# Patient Record
Sex: MALE | Race: WHITE | NOT HISPANIC OR LATINO | Employment: UNEMPLOYED | URBAN - NONMETROPOLITAN AREA
[De-identification: names, ages, dates, MRNs, and addresses within clinical notes are randomized per-mention and may not be internally consistent; named-entity substitution may affect disease eponyms.]

---

## 2019-09-16 ENCOUNTER — OFFICE VISIT (OUTPATIENT)
Dept: ORTHOPEDIC SURGERY | Facility: CLINIC | Age: 54
End: 2019-09-16

## 2019-09-16 ENCOUNTER — HOSPITAL ENCOUNTER (OUTPATIENT)
Dept: GENERAL RADIOLOGY | Facility: HOSPITAL | Age: 54
End: 2019-09-16

## 2019-09-16 VITALS
WEIGHT: 250 LBS | HEIGHT: 72 IN | DIASTOLIC BLOOD PRESSURE: 102 MMHG | SYSTOLIC BLOOD PRESSURE: 154 MMHG | BODY MASS INDEX: 33.86 KG/M2 | HEART RATE: 97 BPM

## 2019-09-16 DIAGNOSIS — G89.29 CHRONIC PAIN OF RIGHT KNEE: ICD-10-CM

## 2019-09-16 DIAGNOSIS — M25.461 PAIN AND SWELLING OF KNEE, RIGHT: ICD-10-CM

## 2019-09-16 DIAGNOSIS — M25.512 LEFT SHOULDER PAIN, UNSPECIFIED CHRONICITY: Primary | ICD-10-CM

## 2019-09-16 DIAGNOSIS — M25.561 PAIN AND SWELLING OF KNEE, RIGHT: ICD-10-CM

## 2019-09-16 DIAGNOSIS — M25.561 CHRONIC PAIN OF RIGHT KNEE: ICD-10-CM

## 2019-09-16 PROCEDURE — 99203 OFFICE O/P NEW LOW 30 MIN: CPT | Performed by: ORTHOPAEDIC SURGERY

## 2019-09-16 RX ORDER — CYCLOBENZAPRINE HCL 10 MG
10 TABLET ORAL 3 TIMES DAILY PRN
COMMUNITY

## 2019-09-16 RX ORDER — IBUPROFEN 800 MG/1
800 TABLET ORAL EVERY 6 HOURS PRN
COMMUNITY

## 2019-09-16 NOTE — PROGRESS NOTES
New Patient Visit      Patient: KAMRAN Bridges  YOB: 1965  Date of Encounter: 09/16/2019        Chief Complaint:   Chief Complaint   Patient presents with   • Left Shoulder - Pain, Initial Evaluation   • Right Knee - Pain, Initial Evaluation         HPI:   KAMRAN Bridges, 53 y.o. male, referred by Mitul Drummond MD for evaluation of left shoulder pain and right knee pain.  Regarding his right knee he reports pain for many years.  He sustained injury to his right knee in the 90s.  He reports intermittent limp since then with pain upon weightbearing.  He is received steroid injection in the past with no improvement he also became very nauseous and is reluctant to receive additional steroid injections.  He currently wears a brace to his right knee.  He states that his right knee at times feels unstable as if it is going to hyperextend again which is painful for him.  Denies actual giving way or locking he does acknowledge intermittent swelling.  Thus far other than single steroid injection he has not had  treatment for his right knee.  Regarding his left shoulder he underwent surgery January 19, 2019 per Dr. Yanes.  He continues to experience pain rest and worse by activity.  He localizes pain diffusely about his shoulder from the posterior aspect along the lateral scapular border.  He denies numbness in his left arm but does complain of significant weakness stating that he was very strong in the past and now he is unable to do much lifting or other strenuous activity.    Active Problem List:  There is no problem list on file for this patient.        Past Medical History:  History reviewed. No pertinent past medical history.      Past Surgical History:  Past Surgical History:   Procedure Laterality Date   • ROTATOR CUFF REPAIR Left          Family History:  History reviewed. No pertinent family history.      Social History:  Social History     Socioeconomic History   • Marital status: Single      "Spouse name: Not on file   • Number of children: Not on file   • Years of education: Not on file   • Highest education level: Not on file   Tobacco Use   • Smoking status: Former Smoker   • Smokeless tobacco: Never Used   Substance and Sexual Activity   • Alcohol use: Yes   • Drug use: No     Body mass index is 33.91 kg/m².      Medications:  Current Outpatient Medications   Medication Sig Dispense Refill   • cyclobenzaprine (FLEXERIL) 10 MG tablet Take 10 mg by mouth 3 (Three) Times a Day As Needed for Muscle Spasms.     • ibuprofen (ADVIL,MOTRIN) 800 MG tablet Take 800 mg by mouth Every 6 (Six) Hours As Needed for Mild Pain .       No current facility-administered medications for this visit.          Allergies:  Allergies   Allergen Reactions   • Naproxen Shortness Of Breath         Review of Systems:   Review of Systems   Constitutional: Positive for fatigue.   HENT: Negative.    Eyes: Negative.    Respiratory: Negative.    Cardiovascular: Negative.    Gastrointestinal: Negative.    Endocrine: Negative.    Genitourinary: Negative.    Musculoskeletal: Positive for arthralgias and back pain.   Skin: Negative.    Allergic/Immunologic: Negative.    Hematological: Negative.    Psychiatric/Behavioral: Negative.          Physical Exam:   Physical Exam  GENERAL: 53 y.o. male, alert and oriented X 3 in no acute distress.   Visit Vitals  BP (!) 154/102   Pulse 97   Ht 182.9 cm (72\")   Wt 113 kg (250 lb)   BMI 33.91 kg/m²     Musculoskeletal right knee evaluation reveals mild effusion with moderate medial joint line tenderness mild lateral joint line tenderness no gross instability with varus valgus stressing Lachman or drawer.  He demonstrates full extension flexes to 130 degrees with negative Sarah.  Patella is without discomfort or crepitance no tenderness along the patellar ligament.  Neurovascular examination is grossly intact.  Shoulder evaluation reveals multiple scars from previous surgery.  He has mild atrophy " supraspinatus region.  He can abduct to 90 degrees and has good strength with Jobes maneuver with mild pain.  Prehension sign is negative but he is reluctant to move in all directions at full extremes.  He demonstrates moderate tenderness posteriorly along the lateral border of the scapula.  Neurovascular exam to the left arm is intact.      Radiology/Labs:     MRI left shoulder by report describes partial thickness articular surface tear of less than 50% involving the supraspinatus tendon.  Postoperative changes AC joint postoperative changes biceps tenodesis SLAP tear extending to the posterior labrum.  This MRI was completed postoperatively August 2017.  Right knee radiographs are negative.    Assessment & Plan:   53 y.o. male with knee complaints of many years concerning for meniscal tear given his failed response to intra-articular steroid injection.  We have requested MRI of his right knee and we will see him back when completed.  Regarding his left shoulder complaints I think he is better served seeing [facility] medicine or shoulder specialist he is referred to the [facility].  He will follow-up right knee once MRI is completed.      ICD-10-CM ICD-9-CM   1. Left shoulder pain, unspecified chronicity M25.512 719.41   2. Chronic pain of right knee M25.561 719.46    G89.29 338.29   3. Pain and swelling of knee, right M25.561 719.46    M25.461 719.06           Cc:   Mitul Drummond MD                This document has been electronically signed by David Pacheco MD   September 18, 2019 8:25 AM

## 2019-10-01 ENCOUNTER — HOSPITAL ENCOUNTER (OUTPATIENT)
Dept: MRI IMAGING | Facility: HOSPITAL | Age: 54
Discharge: HOME | End: 2019-10-01
Admitting: ORTHOPAEDIC SURGERY

## 2019-10-01 DIAGNOSIS — M25.561 PAIN AND SWELLING OF KNEE, RIGHT: ICD-10-CM

## 2019-10-01 DIAGNOSIS — M25.561 CHRONIC PAIN OF RIGHT KNEE: ICD-10-CM

## 2019-10-01 DIAGNOSIS — M25.461 PAIN AND SWELLING OF KNEE, RIGHT: ICD-10-CM

## 2019-10-01 DIAGNOSIS — G89.29 CHRONIC PAIN OF RIGHT KNEE: ICD-10-CM

## 2019-10-01 PROCEDURE — 73721 MRI JNT OF LWR EXTRE W/O DYE: CPT | Performed by: RADIOLOGY

## 2019-10-01 PROCEDURE — 73721 MRI JNT OF LWR EXTRE W/O DYE: CPT

## 2019-10-07 ENCOUNTER — OFFICE VISIT (OUTPATIENT)
Dept: ORTHOPEDIC SURGERY | Facility: CLINIC | Age: 54
End: 2019-10-07

## 2019-10-07 VITALS — WEIGHT: 250 LBS | BODY MASS INDEX: 33.86 KG/M2 | HEIGHT: 72 IN

## 2019-10-07 DIAGNOSIS — G89.29 CHRONIC PAIN OF RIGHT KNEE: Primary | ICD-10-CM

## 2019-10-07 DIAGNOSIS — S83.221D PERIPHERAL TEAR OF MEDIAL MENISCUS OF RIGHT KNEE, UNSPECIFIED WHETHER OLD OR CURRENT TEAR, SUBSEQUENT ENCOUNTER: ICD-10-CM

## 2019-10-07 DIAGNOSIS — M25.561 CHRONIC PAIN OF RIGHT KNEE: Primary | ICD-10-CM

## 2019-10-07 PROCEDURE — 99213 OFFICE O/P EST LOW 20 MIN: CPT | Performed by: ORTHOPAEDIC SURGERY

## 2019-10-07 PROCEDURE — 20610 DRAIN/INJ JOINT/BURSA W/O US: CPT | Performed by: ORTHOPAEDIC SURGERY

## 2019-10-07 RX ADMIN — METHYLPREDNISOLONE ACETATE 80 MG: 40 INJECTION, SUSPENSION INTRA-ARTICULAR; INTRALESIONAL; INTRAMUSCULAR; SOFT TISSUE at 12:27

## 2019-10-07 RX ADMIN — LIDOCAINE HYDROCHLORIDE 2 ML: 20 INJECTION, SOLUTION INFILTRATION; PERINEURAL at 12:27

## 2019-10-07 NOTE — PROGRESS NOTES
Follow-up Visit         Patient: KAMRAN Bridges  YOB: 1965  Date of Encounter: 10/07/2019      Chief  Complaint:   Chief Complaint   Patient presents with   • Right Knee - Pain         HPI:  KAMRAN Bridges, 53 y.o. male returns today in follow-up with continued right knee pain.  His only injury was in the 90s.  He complains of pain when walking and reports that he limps.  He has had single steroid injection right knee which gave him a few hours of relief.  He continues to wear a brace and states that he feels more stable with his brace in place.  He has now had MRI completed.    Medical History:  Patient Active Problem List   Diagnosis   • Chronic pain of right knee     Past Medical History:   Diagnosis Date   • Right knee pain        Social History:  Social History     Socioeconomic History   • Marital status: Single     Spouse name: Not on file   • Number of children: Not on file   • Years of education: Not on file   • Highest education level: Not on file   Tobacco Use   • Smoking status: Former Smoker   • Smokeless tobacco: Never Used   Substance and Sexual Activity   • Alcohol use: Yes   • Drug use: No       Surgical History:  Past Surgical History:   Procedure Laterality Date   • ROTATOR CUFF REPAIR Left        Radiology:   MRI right knee reveals horizontal signal through the posterior and midportion of the medial meniscus with abnormal tunnel seen on both coronal and sagittal sections.  On lateral image 20 of 25 there is horizontal signal within the posterior horn the medial meniscus extending to the inferior articular surface.      Examination:   Right knee evaluation reveals marked medial joint line tenderness.  He has minimal effusion.  He demonstrates no gross instability with varus valgus stressing.  Lockman and drawer are negative.  He demonstrates full extension he flexes to 120 degrees with moderately positive Sarah sign.  Neurovascular examination is grossly intact.      Assessment &  Plan:   53 y.o. male clinical presentation consistent with medial meniscus tear supported by MRI findings.  Think he is a candidate for arthroscopic partial medial meniscectomy.  Today he is provided Depo-Medrol 40 mg intra-articular with lidocaine block.  We will evaluate his progress in 3 weeks.  If he does not receive significant relief we will likely proceed with arthroscopic partial medial meniscectomy.         Diagnosis Plan   1. Chronic pain of right knee  Large Joint Arthrocentesis: R knee   2. Peripheral tear of medial meniscus of right knee, unspecified whether old or current tear, subsequent encounter         Large Joint Arthrocentesis: R knee  Date/Time: 10/7/2019 12:27 PM  Consent given by: patient  Timeout: Immediately prior to procedure a time out was called to verify the correct patient, procedure, equipment, support staff and site/side marked as required   Supporting Documentation  Indications: pain   Procedure Details  Location: knee - R knee  Preparation: Patient was prepped and draped in the usual sterile fashion  Needle size: 25 G  Approach: anterolateral  Medications administered: 2 mL lidocaine 2%; 80 mg methylPREDNISolone acetate 40 MG/ML  Patient tolerance: patient tolerated the procedure well with no immediate complications              Cc:  Mitul Drummond MD              This document has been electronically signed by David Pacheco MD   October 7, 2019 6:17 PM

## 2019-10-14 RX ORDER — METHYLPREDNISOLONE ACETATE 40 MG/ML
80 INJECTION, SUSPENSION INTRA-ARTICULAR; INTRALESIONAL; INTRAMUSCULAR; SOFT TISSUE
Status: COMPLETED | OUTPATIENT
Start: 2019-10-07 | End: 2019-10-07

## 2019-10-14 RX ORDER — LIDOCAINE HYDROCHLORIDE 20 MG/ML
2 INJECTION, SOLUTION INFILTRATION; PERINEURAL
Status: COMPLETED | OUTPATIENT
Start: 2019-10-07 | End: 2019-10-07

## 2019-10-28 ENCOUNTER — OFFICE VISIT (OUTPATIENT)
Dept: ORTHOPEDIC SURGERY | Facility: CLINIC | Age: 54
End: 2019-10-28

## 2019-10-28 VITALS
HEART RATE: 87 BPM | HEIGHT: 72 IN | WEIGHT: 249.12 LBS | SYSTOLIC BLOOD PRESSURE: 140 MMHG | DIASTOLIC BLOOD PRESSURE: 97 MMHG | BODY MASS INDEX: 33.74 KG/M2

## 2019-10-28 DIAGNOSIS — S83.221D PERIPHERAL TEAR OF MEDIAL MENISCUS OF RIGHT KNEE, UNSPECIFIED WHETHER OLD OR CURRENT TEAR, SUBSEQUENT ENCOUNTER: Primary | ICD-10-CM

## 2019-10-28 DIAGNOSIS — M25.512 LEFT SHOULDER PAIN, UNSPECIFIED CHRONICITY: ICD-10-CM

## 2019-10-28 PROCEDURE — 99214 OFFICE O/P EST MOD 30 MIN: CPT | Performed by: ORTHOPAEDIC SURGERY

## 2019-10-28 NOTE — PROGRESS NOTES
History and Physical      Patient: KAMRAN Bridges  YOB: 1965  Date of Encounter: 10/28/2019      Chief Complaint:   Chief Complaint   Patient presents with   • Right Knee - Pain, Edema, Follow-up         HPI:   KAMRAN Birdges, 53 y.o. male, seen today in follow-up with his right knee complaints.  He continues to struggle with right knee pain and reports slight relief following steroid injection for a few weeks but his symptoms have persisted and he is also received steroid injection prior to this injection provided October 7 again with very limited response.  He localizes pain to the medial aspect and reports that his symptoms are worsening.  He denies recent injury but does report injury to his he does not experience giving way.  Does report catching sensation at times and that he walks with a limp when his symptoms are severe.  He has worn brace in the past states this makes his knee feel more stable.  Right knee in the 1990s.      Active Problem List:  Patient Active Problem List   Diagnosis   • Chronic pain of right knee         Past Medical History:  Past Medical History:   Diagnosis Date   • Right knee pain          Past Surgical History:  Past Surgical History:   Procedure Laterality Date   • ROTATOR CUFF REPAIR Left          Family History:  History reviewed. No pertinent family history.      Social History:  Social History     Socioeconomic History   • Marital status: Single     Spouse name: Not on file   • Number of children: Not on file   • Years of education: Not on file   • Highest education level: Not on file   Tobacco Use   • Smoking status: Former Smoker   • Smokeless tobacco: Never Used   Substance and Sexual Activity   • Alcohol use: Yes   • Drug use: No     Body mass index is 33.78 kg/m².      Medications:  Current Outpatient Medications   Medication Sig Dispense Refill   • cyclobenzaprine (FLEXERIL) 10 MG tablet Take 10 mg by mouth 3 (Three) Times a Day As Needed for Muscle Spasms.    "  • ibuprofen (ADVIL,MOTRIN) 800 MG tablet Take 800 mg by mouth Every 6 (Six) Hours As Needed for Mild Pain .       No current facility-administered medications for this visit.          Allergies:  Allergies   Allergen Reactions   • Naproxen Shortness Of Breath         Review of Systems:   Review of Systems   Constitutional: Negative.    HENT: Negative.    Eyes: Negative.    Respiratory: Negative.    Cardiovascular: Negative.    Gastrointestinal: Negative.    Endocrine: Negative.    Genitourinary: Negative.    Musculoskeletal: Positive for arthralgias.   Skin: Negative.    Allergic/Immunologic: Negative.    Neurological: Negative.    Hematological: Negative.    Psychiatric/Behavioral: Negative.          Physical Exam:   Physical Exam   Constitutional: He is oriented to person, place, and time. He appears well-developed and well-nourished.   HENT:   Head: Normocephalic and atraumatic.   Mouth/Throat: Oropharynx is clear and moist.   Eyes: Conjunctivae and EOM are normal. Pupils are equal, round, and reactive to light.   Neck: Normal range of motion. Neck supple. No JVD present. No thyromegaly present.   Cardiovascular: Normal rate, regular rhythm and normal heart sounds. Exam reveals no gallop and no friction rub.   No murmur heard.  Pulmonary/Chest: Effort normal and breath sounds normal. No respiratory distress. He has no wheezes. He has no rales. He exhibits no tenderness.   Abdominal: Bowel sounds are normal.   Lymphadenopathy:     He has no cervical adenopathy.   Neurological: He is alert and oriented to person, place, and time.   Skin: Skin is warm and dry.   Psychiatric: He has a normal mood and affect.   Nursing note and vitals reviewed.    GENERAL: 53 y.o. male, alert and oriented X 3 in no acute distress.   Visit Vitals  /97   Pulse 87   Ht 182.9 cm (72.01\")   Wt 113 kg (249 lb 1.9 oz)   BMI 33.78 kg/m²       Musculoskeletal:   Right knee evaluation reveals mild effusion with moderate tenderness along " the medial joint line minimal lateral joint line tenderness, he demonstrates full extension flexes 230 degrees with moderately positive Sarah.  Lockman and drawer negative.  Patella with normal tracking.  Neurovascular exam grossly intact.    Radiology/Labs:   MRI again reviewed and there is clearly bleak signal seen on lateral projection on T2 images with extension to the inferior articular surface.    Assessment & Plan:   53 y.o. male persistent right knee pain itching sensation and MRI demonstrating probable medial meniscus tear with extension inferiorly.  He has failed to previous steroid injections.  After long discussion we have agreed to proceed with arthroscopy with probable partial medial meniscectomy.  He understands that he may not receive complete relief of his pain.      ICD-10-CM ICD-9-CM   1. Peripheral tear of medial meniscus of right knee, unspecified whether old or current tear, subsequent encounter S83.221D V58.89     836.0   2. Left shoulder pain, unspecified chronicity M25.512 719.41           Cc:   Mitul Drummond MD              This document has been electronically signed by David Pacheco MD   October 28, 2019 5:50 PM

## 2019-10-28 NOTE — PROGRESS NOTES
Follow-up Visit         Patient: KAMRAN Bridges  YOB: 1965  Date of Encounter: 10/28/2019      Chief  Complaint:   Chief Complaint   Patient presents with   • Right Knee - Pain, Edema, Follow-up         HPI:  KAMRAN Bridges, 53 y.o. male     Medical History:  Patient Active Problem List   Diagnosis   • Chronic pain of right knee     Past Medical History:   Diagnosis Date   • Right knee pain        Social History:  Social History     Socioeconomic History   • Marital status: Single     Spouse name: Not on file   • Number of children: Not on file   • Years of education: Not on file   • Highest education level: Not on file   Tobacco Use   • Smoking status: Former Smoker   • Smokeless tobacco: Never Used   Substance and Sexual Activity   • Alcohol use: Yes   • Drug use: No       Surgical History:  Past Surgical History:   Procedure Laterality Date   • ROTATOR CUFF REPAIR Left        Radiology:       Examination:       Assessment & Plan:   53 y.o. male         No diagnosis found.          Cc:  Mitul Drummond MD              This document has been electronically signed by David Pacheco MD   October 28, 2019 9:08 AM

## 2019-10-29 ENCOUNTER — TELEPHONE (OUTPATIENT)
Dept: ORTHOPEDIC SURGERY | Facility: CLINIC | Age: 54
End: 2019-10-29

## 2019-10-29 ENCOUNTER — HOSPITAL ENCOUNTER (OUTPATIENT)
Facility: HOSPITAL | Age: 54
Setting detail: HOSPITAL OUTPATIENT SURGERY
End: 2019-10-29
Attending: ORTHOPAEDIC SURGERY | Admitting: ORTHOPAEDIC SURGERY

## 2019-10-29 PROBLEM — S83.221A PERIPHERAL TEAR OF MEDIAL MENISCUS OF RIGHT KNEE: Status: ACTIVE | Noted: 2019-10-29

## 2019-10-31 DIAGNOSIS — M25.512 LEFT SHOULDER PAIN, UNSPECIFIED CHRONICITY: Primary | ICD-10-CM

## 2019-11-01 ENCOUNTER — TELEPHONE (OUTPATIENT)
Dept: ORTHOPEDIC SURGERY | Facility: CLINIC | Age: 54
End: 2019-11-01

## 2019-11-01 NOTE — TELEPHONE ENCOUNTER
Patient has been notified concerning the denial for his SX Rt. Knee Arthroscopy, Partial Medial Meniscectomy scheduled for 11-7-19. Patient is also aware that PAT 11-4-19 is CX also. Patient verbalized understanding.

## 2019-11-04 ENCOUNTER — APPOINTMENT (OUTPATIENT)
Dept: PREADMISSION TESTING | Facility: HOSPITAL | Age: 54
End: 2019-11-04

## 2023-07-27 DIAGNOSIS — M25.561 RIGHT KNEE PAIN, UNSPECIFIED CHRONICITY: Primary | ICD-10-CM
